# Patient Record
Sex: MALE | Race: WHITE
[De-identification: names, ages, dates, MRNs, and addresses within clinical notes are randomized per-mention and may not be internally consistent; named-entity substitution may affect disease eponyms.]

---

## 2019-09-03 ENCOUNTER — HOSPITAL ENCOUNTER (EMERGENCY)
Dept: HOSPITAL 46 - ED | Age: 29
Discharge: HOME | End: 2019-09-03
Payer: MEDICAID

## 2019-09-03 VITALS — WEIGHT: 225 LBS | BODY MASS INDEX: 29.82 KG/M2 | HEIGHT: 73 IN

## 2019-09-03 DIAGNOSIS — F32.9: Primary | ICD-10-CM

## 2019-09-03 DIAGNOSIS — F19.10: ICD-10-CM

## 2019-09-03 PROCEDURE — G0480 DRUG TEST DEF 1-7 CLASSES: HCPCS

## 2019-09-03 NOTE — EKG
[facility]
                                    [address]
                                  [place], [place]  [address]
_________________________________________________________________________________________
                                                                 Signed   
 
 
Normal sinus rhythm with sinus arrhythmia
Normal ECG
No previous ECGs available
Confirmed by MARYLIN MCDUFFIE MD ([id number] on 9/3/2019 10:04:09 PM
 
 
 
 
 
 
 
 
 
 
 
 
 
 
 
 
 
 
 
 
 
 
 
 
 
 
 
 
 
 
 
 
 
 
 
 
 
 
 
 
 
    Electronically Signed By: MARYLIN MCDUFFIE MD  09/03/19 [id number]
_________________________________________________________________________________________
PATIENT NAME:     RYAN REDDING                        
MEDICAL RECORD #: [id number]                     Electrocardiogram             
          ACCT #: [id number]  
DATE OF BIRTH:   [birth year]                                       
PHYSICIAN:   MARYLIN MCDUFFIE MD           REPORT #: [id number]
REPORT IS CONFIDENTIAL AND NOT TO BE RELEASED WITHOUT AUTHORIZATION